# Patient Record
Sex: FEMALE | Race: WHITE
[De-identification: names, ages, dates, MRNs, and addresses within clinical notes are randomized per-mention and may not be internally consistent; named-entity substitution may affect disease eponyms.]

---

## 2017-09-12 ENCOUNTER — HOSPITAL ENCOUNTER (OUTPATIENT)
Age: 47
End: 2017-09-12
Payer: COMMERCIAL

## 2017-09-12 PROCEDURE — 99202 OFFICE O/P NEW SF 15 MIN: CPT

## 2018-04-10 ENCOUNTER — HOSPITAL ENCOUNTER (OUTPATIENT)
Dept: HOSPITAL 47 - BARWHC3 | Age: 48
Discharge: HOME | End: 2018-04-10
Payer: COMMERCIAL

## 2018-04-10 VITALS — DIASTOLIC BLOOD PRESSURE: 97 MMHG | TEMPERATURE: 98.6 F | SYSTOLIC BLOOD PRESSURE: 150 MMHG | HEART RATE: 93 BPM

## 2018-04-10 VITALS — BODY MASS INDEX: 50.3 KG/M2

## 2018-04-10 DIAGNOSIS — E66.01: Primary | ICD-10-CM

## 2018-04-10 PROCEDURE — 99211 OFF/OP EST MAY X REQ PHY/QHP: CPT

## 2018-04-10 PROCEDURE — 93005 ELECTROCARDIOGRAM TRACING: CPT

## 2018-04-10 NOTE — P.BASOAP
Subjective


Progress Note Date: 04/10/18


Principal diagnosis: 





Morbid obesity





Patient here today interested in sleeve conversion.  She has considered for 

gastric bypass as well but prefers the risk benefit profile of the sleeve.  She 

has gained weight since her band was emptied in the fall.  No nausea or 

vomiting.  No abdominal pain.  No recent endoscopy.





Objective





- Vital Signs


Vital signs: 


 Vital Signs











Temp  98.6 F   04/10/18 15:41


 


Pulse  93   04/10/18 15:41


 


Resp      


 


BP  150/97   04/10/18 15:41


 


Pulse Ox      








 Intake & Output











 04/09/18 04/10/18 04/10/18





 18:59 06:59 18:59


 


Weight   129.773 kg














- Exam





Abdomen: Soft, nontender, nondistended





Assessment/Plan


(1) Morbid obesity


Narrative/Plan: 


Patient I discussed the options of band removal with conversion to either 

sleeve gastrectomy and gastric bypass.  She remains interested in sleeve 

gastrectomy but we'll continue to consider.  We'll plan preoperative upper 

endoscopy.  We'll confer with billing regarding appropriate preoperative 

authorization.





Plan: 


Date: 04/10/18





Initial Weight: 118.705 kg





Initial BMI: 46.0





Current Weight: 129.773 kg





Current BMI: 50.3





Type of Surgery: 





Total Volume in Band: 0





Previous Volume: 





Volume Removed: 





Volume Added: 





Band Size:

## 2018-10-26 ENCOUNTER — HOSPITAL ENCOUNTER (OUTPATIENT)
Dept: HOSPITAL 47 - RADUSWWP | Age: 48
Discharge: HOME | End: 2018-10-26
Attending: INTERNAL MEDICINE
Payer: COMMERCIAL

## 2018-10-26 DIAGNOSIS — R94.4: Primary | ICD-10-CM

## 2018-10-26 PROCEDURE — 76770 US EXAM ABDO BACK WALL COMP: CPT

## 2018-10-26 NOTE — US
EXAMINATION TYPE: US kidneys/renal and bladder

 

DATE OF EXAM: 10/26/2018

 

COMPARISON: NONE

 

CLINICAL HISTORY: R94.4 Abn kidney function studies. Abnormal labs.  No pain per pt. 

 

EXAM MEASUREMENTS:

 

Right Kidney:  9.8 x 5.2 x 4.7 cm

Left Kidney: 10.2 x 4.7 x 5.9 cm

 

 

 

Right Kidney: No hydronephrosis or masses seen  

Left Kidney: No hydronephrosis or masses seen  

Bladder: wnl, distended

**Bilateral Jets seen

 

 

There is no evidence for hydronephrosis at this point in time.  No nephrolithiasis is seen.  No juan m
s are identified.  The urinary bladder is anechoic.  Bilateral ureteral jets are seen.

 

 

 

IMPRESSION:

No significant abnormality appreciated.

## 2018-12-04 ENCOUNTER — HOSPITAL ENCOUNTER (OUTPATIENT)
Dept: HOSPITAL 47 - RADUSWWP | Age: 48
Discharge: HOME | End: 2018-12-04
Attending: INTERNAL MEDICINE
Payer: COMMERCIAL

## 2018-12-04 DIAGNOSIS — R33.9: Primary | ICD-10-CM

## 2018-12-04 PROCEDURE — 76857 US EXAM PELVIC LIMITED: CPT

## 2018-12-05 NOTE — US
EXAMINATION TYPE: US bladder

 

DATE OF EXAM: 12/4/2018

 

COMPARISON: NONE

 

CLINICAL HISTORY: R33.9 Retention of urine. Retention of urine, recurrent UTI

 

EXAM MEASUREMENTS:

 

Post Void Residual Volume:  46.0 mL

 

 

 

Color Doppler performed to assess ureteral jets.

** Bilateral Jets seen:  yes

 

** Normal Post Void Residual (less than 50ml):  yes

 

 

 

IMPRESSION: No distinct abnormality urinary bladder at this time.

## 2022-03-08 ENCOUNTER — HOSPITAL ENCOUNTER (OUTPATIENT)
Dept: HOSPITAL 47 - BARWHC3 | Age: 52
End: 2022-03-08
Attending: SURGERY
Payer: COMMERCIAL

## 2022-03-08 VITALS
SYSTOLIC BLOOD PRESSURE: 176 MMHG | DIASTOLIC BLOOD PRESSURE: 82 MMHG | HEART RATE: 101 BPM | TEMPERATURE: 98.3 F | RESPIRATION RATE: 16 BRPM

## 2022-03-08 VITALS — BODY MASS INDEX: 53 KG/M2

## 2022-03-08 DIAGNOSIS — R10.13: Primary | ICD-10-CM

## 2022-03-08 DIAGNOSIS — Z98.84: ICD-10-CM

## 2022-03-08 PROCEDURE — 99211 OFF/OP EST MAY X REQ PHY/QHP: CPT

## 2022-03-08 NOTE — P.BASOAP
Subjective


Progress Note Date: 03/08/22


Principal diagnosis: 





Epigastric abdominal pain





Patient returns to the bariatric clinic complaining of epigastric pain.  Says is

been going on for the last several months.  Says the pain moves occasionally.  

Sometimes it is near her port other times its in the subxiphoid location.  

Usually made worse by eating.  No right upper quadrant pain.  She does have 

heartburn.  No dysphagia or vomiting.  No workup thus far.  Patient has a band 

present that was emptied years ago.  She has not had restriction since the band 

was emptied.  Patient being evaluated for thyromegaly and says she will be 

having a thyroid surgery for goiter in the near future.





Objective





- Exam





Abdomen: Soft, nontender, nondistended





Assessment/Plan


(1) Epigastric abdominal pain


Narrative/Plan: 


52-year-old female with epigastric abdominal pain.  Patient states she is 

worried about the possibility of an erosion of her lap band.  Agree with that I 

would like to have that possibility eliminated first.  We'll proceed with upper 

endoscopy in the next 1-2 weeks.  If no definite explanation for her pain is 

seen would consider additional diagnostics at that time.  Also discussed 

possible subsequent band removal.





Plan: 


Date: 





Initial Weight: 118.705 kg





Initial BMI: 





Current Weight: 





Current BMI: 





Type of Surgery: 





Total Volume in Band: 0





Previous Volume: 





Volume Removed: 





Volume Added: 





Band Size:
artificial rupture

## 2022-06-17 ENCOUNTER — HOSPITAL ENCOUNTER (OUTPATIENT)
Dept: HOSPITAL 47 - BARWHC3 | Age: 52
End: 2022-06-17
Attending: SURGERY
Payer: COMMERCIAL

## 2022-06-17 VITALS — DIASTOLIC BLOOD PRESSURE: 90 MMHG | SYSTOLIC BLOOD PRESSURE: 168 MMHG | HEART RATE: 88 BPM

## 2022-06-17 VITALS — BODY MASS INDEX: 53.4 KG/M2

## 2022-06-17 DIAGNOSIS — Z88.6: ICD-10-CM

## 2022-06-17 DIAGNOSIS — B96.1: ICD-10-CM

## 2022-06-17 DIAGNOSIS — Z88.5: ICD-10-CM

## 2022-06-17 DIAGNOSIS — K29.70: ICD-10-CM

## 2022-06-17 DIAGNOSIS — Z09: Primary | ICD-10-CM

## 2022-06-17 PROCEDURE — 99211 OFF/OP EST MAY X REQ PHY/QHP: CPT

## 2022-06-17 PROCEDURE — 83013 H PYLORI (C-13) BREATH: CPT

## 2022-12-05 ENCOUNTER — HOSPITAL ENCOUNTER (OUTPATIENT)
Dept: HOSPITAL 47 - RADCTMAIN | Age: 52
Discharge: HOME | End: 2022-12-05
Attending: SURGERY
Payer: COMMERCIAL

## 2022-12-05 DIAGNOSIS — Z53.9: Primary | ICD-10-CM
